# Patient Record
Sex: MALE | Race: WHITE | NOT HISPANIC OR LATINO | Employment: FULL TIME | URBAN - METROPOLITAN AREA
[De-identification: names, ages, dates, MRNs, and addresses within clinical notes are randomized per-mention and may not be internally consistent; named-entity substitution may affect disease eponyms.]

---

## 2023-07-20 ENCOUNTER — OFFICE VISIT (OUTPATIENT)
Dept: PRIMARY CARE | Facility: CLINIC | Age: 43
End: 2023-07-20
Payer: COMMERCIAL

## 2023-07-20 ENCOUNTER — LAB (OUTPATIENT)
Dept: LAB | Facility: LAB | Age: 43
End: 2023-07-20
Payer: COMMERCIAL

## 2023-07-20 VITALS
TEMPERATURE: 97.5 F | SYSTOLIC BLOOD PRESSURE: 122 MMHG | RESPIRATION RATE: 16 BRPM | WEIGHT: 187 LBS | DIASTOLIC BLOOD PRESSURE: 76 MMHG | BODY MASS INDEX: 24.01 KG/M2 | OXYGEN SATURATION: 98 % | HEART RATE: 72 BPM

## 2023-07-20 DIAGNOSIS — R39.9 UTI SYMPTOMS: ICD-10-CM

## 2023-07-20 DIAGNOSIS — R39.9 UTI SYMPTOMS: Primary | ICD-10-CM

## 2023-07-20 LAB
ALANINE AMINOTRANSFERASE (SGPT) (U/L) IN SER/PLAS: 27 U/L (ref 10–52)
ALBUMIN (G/DL) IN SER/PLAS: 4.4 G/DL (ref 3.4–5)
ALKALINE PHOSPHATASE (U/L) IN SER/PLAS: 70 U/L (ref 33–120)
ANION GAP IN SER/PLAS: 9 MMOL/L (ref 10–20)
ASPARTATE AMINOTRANSFERASE (SGOT) (U/L) IN SER/PLAS: 28 U/L (ref 9–39)
BILIRUBIN TOTAL (MG/DL) IN SER/PLAS: 0.8 MG/DL (ref 0–1.2)
CALCIUM (MG/DL) IN SER/PLAS: 9.3 MG/DL (ref 8.6–10.3)
CARBON DIOXIDE, TOTAL (MMOL/L) IN SER/PLAS: 32 MMOL/L (ref 21–32)
CHLORIDE (MMOL/L) IN SER/PLAS: 100 MMOL/L (ref 98–107)
CREATININE (MG/DL) IN SER/PLAS: 0.74 MG/DL (ref 0.5–1.3)
ERYTHROCYTE DISTRIBUTION WIDTH (RATIO) BY AUTOMATED COUNT: 12.3 % (ref 11.5–14.5)
ERYTHROCYTE MEAN CORPUSCULAR HEMOGLOBIN CONCENTRATION (G/DL) BY AUTOMATED: 34.5 G/DL (ref 32–36)
ERYTHROCYTE MEAN CORPUSCULAR VOLUME (FL) BY AUTOMATED COUNT: 94 FL (ref 80–100)
ERYTHROCYTES (10*6/UL) IN BLOOD BY AUTOMATED COUNT: 4.08 X10E12/L (ref 4.5–5.9)
GFR MALE: >90 ML/MIN/1.73M2
GLUCOSE (MG/DL) IN SER/PLAS: 86 MG/DL (ref 74–99)
HEMATOCRIT (%) IN BLOOD BY AUTOMATED COUNT: 38.3 % (ref 41–52)
HEMOGLOBIN (G/DL) IN BLOOD: 13.2 G/DL (ref 13.5–17.5)
LEUKOCYTES (10*3/UL) IN BLOOD BY AUTOMATED COUNT: 14 X10E9/L (ref 4.4–11.3)
PLATELETS (10*3/UL) IN BLOOD AUTOMATED COUNT: 182 X10E9/L (ref 150–450)
POC APPEARANCE, URINE: CLEAR
POC BILIRUBIN, URINE: NEGATIVE
POC BLOOD, URINE: NEGATIVE
POC COLOR, URINE: YELLOW
POC GLUCOSE, URINE: NEGATIVE MG/DL
POC KETONES, URINE: NEGATIVE MG/DL
POC LEUKOCYTES, URINE: ABNORMAL
POC NITRITE,URINE: NEGATIVE
POC PH, URINE: 6 PH
POC PROTEIN, URINE: ABNORMAL MG/DL
POC SPECIFIC GRAVITY, URINE: <=1.005
POC UROBILINOGEN, URINE: 1 EU/DL
POTASSIUM (MMOL/L) IN SER/PLAS: 3.9 MMOL/L (ref 3.5–5.3)
PROTEIN TOTAL: 7.3 G/DL (ref 6.4–8.2)
SODIUM (MMOL/L) IN SER/PLAS: 137 MMOL/L (ref 136–145)
UREA NITROGEN (MG/DL) IN SER/PLAS: 9 MG/DL (ref 6–23)

## 2023-07-20 PROCEDURE — 87186 SC STD MICRODIL/AGAR DIL: CPT

## 2023-07-20 PROCEDURE — 99214 OFFICE O/P EST MOD 30 MIN: CPT | Performed by: NURSE PRACTITIONER

## 2023-07-20 PROCEDURE — 1036F TOBACCO NON-USER: CPT | Performed by: NURSE PRACTITIONER

## 2023-07-20 PROCEDURE — 87077 CULTURE AEROBIC IDENTIFY: CPT

## 2023-07-20 PROCEDURE — 80053 COMPREHEN METABOLIC PANEL: CPT

## 2023-07-20 PROCEDURE — 87086 URINE CULTURE/COLONY COUNT: CPT

## 2023-07-20 PROCEDURE — 85027 COMPLETE CBC AUTOMATED: CPT

## 2023-07-20 PROCEDURE — 36415 COLL VENOUS BLD VENIPUNCTURE: CPT

## 2023-07-20 PROCEDURE — 81002 URINALYSIS NONAUTO W/O SCOPE: CPT | Performed by: NURSE PRACTITIONER

## 2023-07-20 RX ORDER — LISINOPRIL 40 MG/1
20 TABLET ORAL 2 TIMES DAILY
COMMUNITY
End: 2023-12-04 | Stop reason: SDUPTHER

## 2023-07-20 RX ORDER — DIGOXIN 125 MCG
125 TABLET ORAL DAILY
COMMUNITY
End: 2023-12-04 | Stop reason: SDUPTHER

## 2023-07-20 RX ORDER — OMEPRAZOLE 20 MG/1
20 CAPSULE, DELAYED RELEASE ORAL DAILY
COMMUNITY
End: 2023-07-24

## 2023-07-20 RX ORDER — FAMOTIDINE 20 MG/1
20 TABLET, FILM COATED ORAL 2 TIMES DAILY
COMMUNITY
End: 2023-07-24

## 2023-07-20 RX ORDER — METOPROLOL SUCCINATE 100 MG/1
150 TABLET, EXTENDED RELEASE ORAL 2 TIMES DAILY
COMMUNITY
End: 2023-12-04 | Stop reason: SDUPTHER

## 2023-07-20 RX ORDER — DOXYCYCLINE 100 MG/1
100 CAPSULE ORAL 2 TIMES DAILY
Qty: 20 CAPSULE | Refills: 0 | Status: SHIPPED | OUTPATIENT
Start: 2023-07-20 | End: 2023-07-31 | Stop reason: SDUPTHER

## 2023-07-20 ASSESSMENT — ENCOUNTER SYMPTOMS
DIARRHEA: 0
APPETITE CHANGE: 1
FREQUENCY: 1
CHILLS: 1
DYSURIA: 1
HEADACHES: 0
CARDIOVASCULAR NEGATIVE: 1
FLANK PAIN: 0
HEMATURIA: 0
RESPIRATORY NEGATIVE: 1
VOMITING: 0
MYALGIAS: 1
ABDOMINAL PAIN: 0
NAUSEA: 0
FEVER: 0

## 2023-07-20 NOTE — PROGRESS NOTES
Subjective   Patient ID: Tony Pierre is a 42 y.o. male who presents for UTI.    Symptoms started three days ago with dysuria. Patient with the chills last night. No fevers. Patient has frequency and urgency. No blood in the urine. Patient has a stronger smelling urine. No concern for any STIs per pt.          Review of Systems   Constitutional:  Positive for appetite change and chills. Negative for fever.   HENT: Negative.     Respiratory: Negative.     Cardiovascular: Negative.    Gastrointestinal:  Negative for abdominal pain, diarrhea, nausea and vomiting.   Genitourinary:  Positive for dysuria, frequency and urgency. Negative for flank pain, hematuria and testicular pain.   Musculoskeletal:  Positive for myalgias.   Neurological:  Negative for headaches.     Objective   /76   Pulse 72   Temp 36.4 °C (97.5 °F) (Temporal)   Resp 16   Wt 84.8 kg (187 lb)   SpO2 98%   BMI 24.01 kg/m²     Physical Exam  Vitals reviewed.   Constitutional:       General: He is not in acute distress.     Appearance: Normal appearance. He is not ill-appearing or toxic-appearing.   HENT:      Head: Atraumatic.   Eyes:      Conjunctiva/sclera: Conjunctivae normal.   Cardiovascular:      Rate and Rhythm: Normal rate and regular rhythm.      Heart sounds: Normal heart sounds. No murmur heard.  Pulmonary:      Effort: Pulmonary effort is normal.      Breath sounds: Normal breath sounds. No wheezing.   Abdominal:      General: Bowel sounds are normal.      Palpations: Abdomen is soft.      Tenderness: There is no abdominal tenderness. There is no right CVA tenderness, left CVA tenderness or guarding.   Musculoskeletal:         General: Normal range of motion.   Skin:     General: Skin is warm and dry.   Neurological:      General: No focal deficit present.      Mental Status: He is alert.   Psychiatric:         Mood and Affect: Mood normal.         Behavior: Behavior normal.     Assessment/Plan   Problem List Items Addressed  This Visit    None  Visit Diagnoses       UTI symptoms    -  Primary    Relevant Medications    doxycycline (Vibramycin) 100 mg capsule    Other Relevant Orders    Urine Culture    POCT UA (nonautomated) manually resulted (Completed)    CBC    Comprehensive Metabolic Panel        UA indicative of a UTI. will start patient on doxycycline at this time for UTI/prostatitis. will send urine out for culture. Patient had some chills last night, so will get CMP and CBC. patient advised to call the office if symptoms persist in the next 2-3 days despite the use of the medication. patient advised to go to the ER for any fevers, chills, abdominal pain, nausea, vomiting or new/concerning symptoms; he agreed. will call pt when urine culture results become available.

## 2023-07-21 NOTE — RESULT ENCOUNTER NOTE
Tried calling pt several times and it went straight to voicemail. Please let him know that his white blood cell count is high signifying infection. All other labs are stable. How is he feeling?

## 2023-07-23 ENCOUNTER — TELEPHONE (OUTPATIENT)
Dept: PRIMARY CARE | Facility: CLINIC | Age: 43
End: 2023-07-23
Payer: COMMERCIAL

## 2023-07-23 DIAGNOSIS — K20.90 ESOPHAGITIS, UNSPECIFIED WITHOUT BLEEDING: ICD-10-CM

## 2023-07-23 DIAGNOSIS — K21.9 GASTRO-ESOPHAGEAL REFLUX DISEASE WITHOUT ESOPHAGITIS: ICD-10-CM

## 2023-07-23 LAB
URINE CULTURE: ABNORMAL
URINE CULTURE: ABNORMAL

## 2023-07-23 NOTE — TELEPHONE ENCOUNTER
Spoke to patient this morning and relayed results of blood work and urine culture. Blood work was showing that patient has an infection as his WBC was elevated. Patient's urine culture was positive for a UTI. The bacteria is susceptible to doxycycline and he says that he is feeling better. Patient to finish the antibiotics and follow up with his PCP next week. No further questions per pt.

## 2023-07-24 ENCOUNTER — APPOINTMENT (OUTPATIENT)
Dept: PRIMARY CARE | Facility: CLINIC | Age: 43
End: 2023-07-24
Payer: COMMERCIAL

## 2023-07-24 PROBLEM — I50.20 HFREF (HEART FAILURE WITH REDUCED EJECTION FRACTION) (MULTI): Status: ACTIVE | Noted: 2023-07-24

## 2023-07-24 PROBLEM — I42.8 NONISCHEMIC CARDIOMYOPATHY (MULTI): Status: ACTIVE | Noted: 2023-07-24

## 2023-07-24 PROBLEM — K21.9 GASTROESOPHAGEAL REFLUX DISEASE: Status: ACTIVE | Noted: 2023-07-24

## 2023-07-24 PROBLEM — N41.9 PROSTATITIS: Status: ACTIVE | Noted: 2023-07-24

## 2023-07-24 PROBLEM — I50.20 SYSTOLIC HEART FAILURE, ACC/AHA STAGE C (MULTI): Status: ACTIVE | Noted: 2023-07-24

## 2023-07-24 PROBLEM — Z98.890 OTHER SPECIFIED POSTPROCEDURAL STATES: Status: ACTIVE | Noted: 2023-07-24

## 2023-07-24 PROBLEM — R31.9 BLOOD IN URINE: Status: ACTIVE | Noted: 2023-07-24

## 2023-07-24 PROBLEM — I48.92 ATRIAL FLUTTER WITH RAPID VENTRICULAR RESPONSE (MULTI): Status: ACTIVE | Noted: 2023-07-24

## 2023-07-24 PROBLEM — Z95.9 CARDIAC DEVICE IN SITU: Status: ACTIVE | Noted: 2023-07-24

## 2023-07-24 RX ORDER — SPIRONOLACTONE 25 MG/1
TABLET ORAL
COMMUNITY
Start: 2014-09-01 | End: 2023-07-31 | Stop reason: ALTCHOICE

## 2023-07-24 RX ORDER — OMEPRAZOLE 20 MG/1
20 CAPSULE, DELAYED RELEASE ORAL DAILY
Qty: 90 CAPSULE | Refills: 1 | Status: SHIPPED | OUTPATIENT
Start: 2023-07-24 | End: 2024-01-22

## 2023-07-24 RX ORDER — FAMOTIDINE 20 MG/1
20 TABLET, FILM COATED ORAL 2 TIMES DAILY
Qty: 180 TABLET | Refills: 1 | Status: SHIPPED | OUTPATIENT
Start: 2023-07-24 | End: 2024-01-22

## 2023-07-24 RX ORDER — OMEPRAZOLE 20 MG/1
1 TABLET, DELAYED RELEASE ORAL DAILY
COMMUNITY
End: 2023-07-31 | Stop reason: ALTCHOICE

## 2023-07-25 ENCOUNTER — TELEPHONE (OUTPATIENT)
Dept: PRIMARY CARE | Facility: CLINIC | Age: 43
End: 2023-07-25
Payer: COMMERCIAL

## 2023-07-25 NOTE — TELEPHONE ENCOUNTER
----- Message from FAN Antoine-CNP sent at 7/21/2023 10:55 AM EDT -----  Tried calling pt several times and it went straight to voicemail. Please let him know that his white blood cell count is high signifying infection. All other labs are stable. How is he feeling?

## 2023-07-31 ENCOUNTER — OFFICE VISIT (OUTPATIENT)
Dept: PRIMARY CARE | Facility: CLINIC | Age: 43
End: 2023-07-31
Payer: COMMERCIAL

## 2023-07-31 VITALS
SYSTOLIC BLOOD PRESSURE: 98 MMHG | TEMPERATURE: 97 F | WEIGHT: 186 LBS | BODY MASS INDEX: 25.19 KG/M2 | OXYGEN SATURATION: 97 % | HEIGHT: 72 IN | DIASTOLIC BLOOD PRESSURE: 64 MMHG | HEART RATE: 73 BPM | RESPIRATION RATE: 18 BRPM

## 2023-07-31 DIAGNOSIS — R39.9 UTI SYMPTOMS: ICD-10-CM

## 2023-07-31 DIAGNOSIS — K21.9 GASTROESOPHAGEAL REFLUX DISEASE, UNSPECIFIED WHETHER ESOPHAGITIS PRESENT: ICD-10-CM

## 2023-07-31 DIAGNOSIS — Z00.00 HEALTHCARE MAINTENANCE: ICD-10-CM

## 2023-07-31 DIAGNOSIS — N41.9 PROSTATITIS, UNSPECIFIED PROSTATITIS TYPE: Primary | ICD-10-CM

## 2023-07-31 DIAGNOSIS — I42.8 NONISCHEMIC CARDIOMYOPATHY (MULTI): ICD-10-CM

## 2023-07-31 PROCEDURE — 99396 PREV VISIT EST AGE 40-64: CPT | Performed by: FAMILY MEDICINE

## 2023-07-31 PROCEDURE — 1036F TOBACCO NON-USER: CPT | Performed by: FAMILY MEDICINE

## 2023-07-31 RX ORDER — DOXYCYCLINE 100 MG/1
100 CAPSULE ORAL 2 TIMES DAILY
Qty: 60 CAPSULE | Refills: 1 | Status: SHIPPED | OUTPATIENT
Start: 2023-07-31 | End: 2023-08-30

## 2023-07-31 NOTE — ASSESSMENT & PLAN NOTE
Antibiotic treatment x30 days and will get urology opinion..  In light of recurrence question abscess

## 2023-07-31 NOTE — PATIENT INSTRUCTIONS

## 2023-07-31 NOTE — ASSESSMENT & PLAN NOTE
Discussed with patient recent prostatitis we will repeat urine culture and treat x30 days.    Reviewed health maintenance issues and continue meds for now with good blood pressure control and prior history of..Nonischemic cardiomyopathy along with atrial flutter with rapid ventricular response followed by cardiology

## 2023-07-31 NOTE — PROGRESS NOTES
Tony Pierre is a 42 y.o. male here today a periodic health exam.  I reviewed previous preventative health measures including screening tests, immunizations and labs.      HPI   CURRENT COMPLAINTS OR CONCERNS:   Patient here for follow-up appointment according to patient he urinary symptoms with and burning type sensation urgency almost had an accident with strong smelling urine complicating chills and appetite change and muscle pains.  Seen in urgent care with lab and evaluation with results revealing urinalysis urine culture showing 20-80,000 colony counts Enterobacter cloacae and sensitive to tetracycline.  Additional with point-of-care urinalysis showing moderate leukocytes in the urine with CBC with elevated white count 14,000 and hemoglobin 13.2 hematocrit 38.3 platelets 182,000 with CMP showing electrolytes within normal limits GFR greater than 90 glucose 86 and liver tests within normal limits.        PREVIOUS PREVENTATIVE HEALTH  Colonoscopy : NO  Cologuard : N/A  Mammogram : N/A  Pap Test :  N/A  PSA : N/A  Hepatitis C Antibody : YES -- DATE 3/14/18  HIV Screening : YES -- DATE 3/14/18  Prevnar : NO  Tdap : YES -- DATE 7/19/21  Shingrix : NO  Yearly Flu Shot : YES    CURRENT FINDINGS  Healthy Diet : YES  Exercise :  NO  Depression Issues : NO  Alcohol Use : YES --  2  rum  and diet coke  Tobacco Use : NO        Current Outpatient Medications:     digoxin (Lanoxin) 125 MCG tablet, Take 1 tablet (125 mcg) by mouth once daily., Disp: , Rfl:     famotidine (Pepcid) 20 mg tablet, TAKE 1 TABLET BY MOUTH TWICE A DAY, Disp: 180 tablet, Rfl: 1    lisinopril 40 mg tablet, Take 0.5 tablets (20 mg) by mouth 2 times a day., Disp: , Rfl:     metoprolol succinate XL (Toprol-XL) 100 mg 24 hr tablet, Take 1.5 tablets (150 mg) by mouth 2 times a day. Take 1-1/2 tablets (150 mg) twice daily, Disp: , Rfl:     omeprazole (PriLOSEC) 20 mg DR capsule, TAKE 1 CAPSULE BY MOUTH EVERY DAY IN THE MORNING BEFORE BREAKFAST, Disp: 90  capsule, Rfl: 1    doxycycline (Vibramycin) 100 mg capsule, Take 1 capsule (100 mg) by mouth 2 times a day. Take with at least 8 ounces (large glass) of water, do not lie down for 30 minutes after, Disp: 60 capsule, Rfl: 1    Past Medical History:   Diagnosis Date    Personal history of other diseases of the circulatory system 03/05/2018    History of cardiomyopathy    Reflux esophagitis        Past Surgical History:   Procedure Laterality Date    ABLATION OF DYSRHYTHMIC FOCUS      HERNIA REPAIR  04/26/2018    Hernia Repair       Family History   Problem Relation Name Age of Onset    Arthritis Mother         Social History     Tobacco Use    Smoking status: Former     Packs/day: 21.00     Types: Cigarettes    Smokeless tobacco: Former   Vaping Use    Vaping Use: Never used   Substance Use Topics    Alcohol use: Yes     Alcohol/week: 2.0 standard drinks of alcohol     Types: 2 Shots of liquor per week     Comment: occasional    Drug use: Never       Immunization History   Administered Date(s) Administered    Pfizer Purple Cap SARS-CoV-2 03/16/2021, 04/13/2021    Tdap vaccine, age 10 years and older (BOOSTRIX) 07/19/2021   Constitutional; no fever no chills no recent weight gain and no recent weight loss.  eyes:; no loss of vision no discharge from the eyes and no itching of the eyes.  ENT; no neck pain symptoms no ear symptoms and no nasal symptoms.  Cardiovascular; no chest pain no palpitations and no lower extremity edema.  Respiratory; no shortness of breath no cough and no shortness of breath during exertion.  gastrointestinal; no abdominal pain no constipation no heartburn no vomiting no blood in stools and bowel movement frequency normal.  genitourinary; yes  dysuria no hematuria and no pyuria.  Musculoskeletal; no arthralgias and no myalgias.  integumentary;.. No skin lesions  Neurologic. no headaches and no dizziness  hematologic/lymphatic; swollen glands no tendency for easy bleeding and no tendency for easy  bruising  Psychiatric; no anxiety no depression and no emotional problems.       Objective    Visit Vitals  BP 98/64   Pulse 73   Temp 36.1 °C (97 °F)   Resp 18       Physical Exam   Vitals: I have reviewed the vitals  General: Well-developed.  In no acute distress.  Eyes:   sclera nonicteric.  Conjunctiva not injected.  No discharge.   HEAD: Normocephalic, atraumatic.  HEENT   Mucous membranes moist.  Posterior oropharynx nonerythematous, no tonsillar exudates.      No cervical lymphadenopathy.  Cardio: Regular rate and rhythm.  No murmur, rub or gallop.  Pulmonary: Lungs clear to auscultation in all fields.  No accessory muscle use.  GI/: Normal active bowel sounds.  Soft, nontender.  No masses or organomegaly appreciated.  Musculoskeletal: No gross deformities appreciated.  Neuro: Alert, age-appropriate.  Normal muscle tone.  Moving all extremities.  Skin: No rash, bruises or lesions.     Assessment    1. Prostatitis, unspecified prostatitis type  Urine Culture, Sedimentation Rate, CBC and Auto Differential, Prostate Specific Antigen, Screen      2. UTI symptoms  doxycycline (Vibramycin) 100 mg capsule, Referral to Urology      3. Nonischemic cardiomyopathy (CMS/HCC)  Lipid Panel      4. Gastroesophageal reflux disease, unspecified whether esophagitis present  Vitamin B12      5. Healthcare maintenance

## 2023-12-04 ENCOUNTER — OFFICE VISIT (OUTPATIENT)
Dept: CARDIOLOGY | Facility: CLINIC | Age: 43
End: 2023-12-04
Payer: COMMERCIAL

## 2023-12-04 VITALS
HEIGHT: 73 IN | OXYGEN SATURATION: 98 % | BODY MASS INDEX: 24.54 KG/M2 | SYSTOLIC BLOOD PRESSURE: 120 MMHG | DIASTOLIC BLOOD PRESSURE: 74 MMHG | HEART RATE: 76 BPM

## 2023-12-04 DIAGNOSIS — I50.20 SYSTOLIC HEART FAILURE, ACC/AHA STAGE C (MULTI): Primary | ICD-10-CM

## 2023-12-04 DIAGNOSIS — I42.8 NONISCHEMIC CARDIOMYOPATHY (MULTI): ICD-10-CM

## 2023-12-04 PROCEDURE — 1036F TOBACCO NON-USER: CPT | Performed by: INTERNAL MEDICINE

## 2023-12-04 PROCEDURE — 93000 ELECTROCARDIOGRAM COMPLETE: CPT | Performed by: INTERNAL MEDICINE

## 2023-12-04 PROCEDURE — 99213 OFFICE O/P EST LOW 20 MIN: CPT | Performed by: INTERNAL MEDICINE

## 2023-12-04 RX ORDER — METOPROLOL SUCCINATE 100 MG/1
150 TABLET, EXTENDED RELEASE ORAL 2 TIMES DAILY
Qty: 270 TABLET | Refills: 3 | Status: SHIPPED | OUTPATIENT
Start: 2023-12-04

## 2023-12-04 RX ORDER — LISINOPRIL 40 MG/1
20 TABLET ORAL 2 TIMES DAILY
Qty: 90 TABLET | Refills: 3 | Status: SHIPPED | OUTPATIENT
Start: 2023-12-04

## 2023-12-04 RX ORDER — DIGOXIN 125 MCG
125 TABLET ORAL DAILY
Qty: 90 TABLET | Refills: 3 | Status: SHIPPED | OUTPATIENT
Start: 2023-12-04

## 2023-12-04 ASSESSMENT — PAIN SCALES - GENERAL: PAINLEVEL: 0-NO PAIN

## 2023-12-04 NOTE — PATIENT INSTRUCTIONS
It was a pleasure seeing you today. Please contact myself or my team with any questions.     To reach Dr. Rodriguez' office please call [phone] ([place]).   Fax: [phone]   To schedule an appointment call [phone]     If you have any questions or need cardiac medication refills, please call the Heart Failure office at [phone], option [phone]. You may also contact the  Heart Failure Nursing team via email at [email or web address] (Please include your name and date of birth).       1) Continue your current medications  2) Follow up in 1 year at /[facility]

## 2023-12-04 NOTE — PROGRESS NOTES
"[facility] Advanced Heart Failure Clinic  Primary Care Physician: Kyaw Washington DO  Referring Provider/Cardiologist: Nkechi     Date of Visit: 12/04/2023  3:20 PM EST  Location of visit:  [address] [place] [address]     HPI:   Mr. Pierre is a 43M with a PMHx sig for AF/Aflutter s/p multiple ablations and stage C systolic HF/NICM/HFimpEF who returns to the Advanced Heart Failure clinic for ongoing evaluation and management of his cardiomyopathy.     Interval Hx:   Currently denies chest pain, palpitations, shortness of breath, dyspnea on exertion, orthopnea, PND. No edema noted in BLE. Patient denies headaches, dizziness or recent falls.     Patient denies hospitalizations since last visit 1 year ago.       FamHx:  No cardiac history    SocHx:  , living in [facility] with his wife  Nicotine - started at age 17 and was up to 1 ppd prior to quitting (2018)   Alcohol - two drinks per night (rum and coke)  Illicit Drugs - past marijuana use but quit at 20 years ago. No IV drug use.  Former member of the [employer], traveled to multiple places       Current Outpatient Medications   Medication Sig Dispense Refill    digoxin (Lanoxin) 125 MCG tablet Take 1 tablet (125 mcg) by mouth once daily.      famotidine (Pepcid) 20 mg tablet TAKE 1 TABLET BY MOUTH TWICE A  tablet 1    lisinopril 40 mg tablet Take 0.5 tablets (20 mg) by mouth 2 times a day.      metoprolol succinate XL (Toprol-XL) 100 mg 24 hr tablet Take 1.5 tablets (150 mg) by mouth 2 times a day. Take 1-1/2 tablets (150 mg) twice daily      omeprazole (PriLOSEC) 20 mg DR capsule TAKE 1 CAPSULE BY MOUTH EVERY DAY IN THE MORNING BEFORE BREAKFAST 90 capsule 1     No current facility-administered medications for this visit.       No Known Allergies      Visit Vitals  /74 (BP Location: Right arm, Patient Position: Sitting, BP Cuff Size: Adult)   Pulse 76   Ht 1.854 m (6' 1\")   SpO2 98%   BMI 24.54 kg/m²   Smoking Status Former   BSA 2.08 m²    "     Physical Exam:  On exam Mr. iPerre appears his stated age, is alert and oriented x3, and in no acute distress. His sclera are anicteric and his oropharynx has moist mucous membranes. His neck is supple and without thyromegaly. The JVP is ~5 cm of water above the right atrium. His cardiac exam has a regular rhythm, normal S1, S2. No S3/4. There are no murmurs. His lungs are clear to auscultation bilaterally and there is no dullness to percussion. His abdomen is soft, nontender with normoactive bowel sounds. There is no HJR. The extremities are warm and without edema. The skin is dry. There is no rash present. The distal pulses are 2-3+ in all four extremities. His mood and affect are appropriate for todays encounter.      Cardiac Labs/Diagnostics:    Lab Results   Component Value Date    CREATININE 0.74 07/20/2023    BUN 9 07/20/2023     07/20/2023    K 3.9 07/20/2023     07/20/2023    CO2 32 07/20/2023      ECG (12/4/23):  Sinus rhythm (HR 75), normal ECG    Echo (1/20/23):  1. Left ventricular systolic function is low normal with a 50-55% estimated ejection fraction.  2. There is no Doppler evidence of pulmonary hypertension.    ECG (12/13/22):  Sinus rhythm (HR 80), normal ECG    Echo (10/1/18):   1. The left ventricular systolic function is mildly decreased with a 40-45% estimated ejection fraction.   2. Spectral Doppler shows a pseudonormal pattern of left ventricular diastolic filling.   3. There is no evidence of left ventricular hypertrophy.   4. There is global hypokinesis of the left ventricle with minor regional variations.    Echo (2/15/18):  1. The left ventricular systolic function is mildly decreased with a 35-40% estimated ejection fraction.  2. Spectral Doppler shows an impaired relaxation pattern of left ventricular diastolic filling.    Cardiac cath (2/2012):  1. Left main angiographically normal and bifurcates into an LAD and circumflex.  2. LAD angiographically normal with large  first and second diagonal branches which are angiographically normal.  3. Circumflex nondominant and angiographically normal. There is a large first obtuse marginal branch.  4. RCA large dominant vessel which is angiographically normal and has a robust PDA system.  5. Left ventriculography: Dilated left ventricle with reduced systolic function. Ejection fraction roughly 25%. 1+ mitral regurgitation noted.  6. Left heart hemodynamics: Left ventricular systolic pressure low at 85          Impression/Plan:  Mr. Pierre is a 43M with a PMHx sig for AF/Aflutter s/p multiple ablations and stage C systolic HF/NICM/HFimpEF who returns to the Advanced Heart Failure clinic for ongoing evaluation and management of his cardiomyopathy. At the current time he has functional class I symptoms and appears euvolemic on exam.     1) Stage C chronic systolic HF/NICM/HFimpEF (LVEF 40-45-->50-55%; 1/2023); currently without an ICD  Tolerating his current GDMT.   -c/w metoprolol succinate 150 mg bid, lisinopril 20 mg bid, digoxin 125 mcg daily  -will refrain from spironolactone at the current time given h/o borderline elevated K; will also defer SGLT2 given volume status  -no need for ICD at the current time    2) AF/flutter s/p multiple ablations  Remains in sinus rhythm. DOAC stopped by Dr. Arboleda.  -mgmt by Dr. Arboleda      F/U: annually at /[facility]      Krishna,  Thank you for referring Mr. Pierre to the  Advanced Heart Failure Clinic. Please let me know if you have any questions.       ____________________________________________________________  Andrew Rodriguez DO  Section of Advanced Heart Failure and Cardiac Transplantation  Division of Cardiovascular Medicine  [facility]  [facility]

## 2024-01-21 DIAGNOSIS — K21.9 GASTRO-ESOPHAGEAL REFLUX DISEASE WITHOUT ESOPHAGITIS: ICD-10-CM

## 2024-01-21 DIAGNOSIS — K20.90 ESOPHAGITIS, UNSPECIFIED WITHOUT BLEEDING: ICD-10-CM

## 2024-01-22 RX ORDER — OMEPRAZOLE 20 MG/1
20 CAPSULE, DELAYED RELEASE ORAL DAILY
Qty: 90 CAPSULE | Refills: 1 | Status: SHIPPED | OUTPATIENT
Start: 2024-01-22

## 2024-01-22 RX ORDER — FAMOTIDINE 20 MG/1
20 TABLET, FILM COATED ORAL 2 TIMES DAILY
Qty: 180 TABLET | Refills: 1 | Status: SHIPPED | OUTPATIENT
Start: 2024-01-22

## 2024-07-19 DIAGNOSIS — K20.90 ESOPHAGITIS, UNSPECIFIED WITHOUT BLEEDING: ICD-10-CM

## 2024-07-19 RX ORDER — OMEPRAZOLE 20 MG/1
20 CAPSULE, DELAYED RELEASE ORAL DAILY
Qty: 90 CAPSULE | Refills: 1 | Status: SHIPPED | OUTPATIENT
Start: 2024-07-19

## 2024-07-29 ENCOUNTER — APPOINTMENT (OUTPATIENT)
Dept: PRIMARY CARE | Facility: CLINIC | Age: 44
End: 2024-07-29
Payer: COMMERCIAL

## 2024-08-19 ENCOUNTER — APPOINTMENT (OUTPATIENT)
Dept: PRIMARY CARE | Facility: CLINIC | Age: 44
End: 2024-08-19
Payer: COMMERCIAL

## 2024-08-26 ENCOUNTER — APPOINTMENT (OUTPATIENT)
Dept: PRIMARY CARE | Facility: CLINIC | Age: 44
End: 2024-08-26
Payer: COMMERCIAL

## 2024-08-26 VITALS
RESPIRATION RATE: 18 BRPM | BODY MASS INDEX: 25.84 KG/M2 | SYSTOLIC BLOOD PRESSURE: 99 MMHG | DIASTOLIC BLOOD PRESSURE: 64 MMHG | HEART RATE: 64 BPM | OXYGEN SATURATION: 98 % | WEIGHT: 195 LBS | HEIGHT: 73 IN | TEMPERATURE: 97 F

## 2024-08-26 DIAGNOSIS — Z00.00 HEALTHCARE MAINTENANCE: ICD-10-CM

## 2024-08-26 DIAGNOSIS — E66.3 OVERWEIGHT (BMI 25.0-29.9): ICD-10-CM

## 2024-08-26 DIAGNOSIS — R31.9 HEMATURIA, UNSPECIFIED TYPE: ICD-10-CM

## 2024-08-26 DIAGNOSIS — I50.20 SYSTOLIC HEART FAILURE, ACC/AHA STAGE C (MULTI): ICD-10-CM

## 2024-08-26 DIAGNOSIS — K21.9 GASTROESOPHAGEAL REFLUX DISEASE, UNSPECIFIED WHETHER ESOPHAGITIS PRESENT: Primary | ICD-10-CM

## 2024-08-26 PROBLEM — I48.92 ATRIAL FLUTTER WITH RAPID VENTRICULAR RESPONSE (MULTI): Status: RESOLVED | Noted: 2023-07-24 | Resolved: 2024-08-26

## 2024-08-26 LAB
POC APPEARANCE, URINE: CLEAR
POC BILIRUBIN, URINE: NEGATIVE
POC BLOOD, URINE: NEGATIVE
POC COLOR, URINE: YELLOW
POC GLUCOSE, URINE: NEGATIVE MG/DL
POC KETONES, URINE: NEGATIVE MG/DL
POC LEUKOCYTES, URINE: NEGATIVE
POC NITRITE,URINE: NEGATIVE
POC PH, URINE: 6.5 PH
POC PROTEIN, URINE: NEGATIVE MG/DL
POC SPECIFIC GRAVITY, URINE: 1.01
POC UROBILINOGEN, URINE: 0.2 EU/DL

## 2024-08-26 PROCEDURE — 99396 PREV VISIT EST AGE 40-64: CPT | Performed by: FAMILY MEDICINE

## 2024-08-26 PROCEDURE — 3008F BODY MASS INDEX DOCD: CPT | Performed by: FAMILY MEDICINE

## 2024-08-26 PROCEDURE — 81003 URINALYSIS AUTO W/O SCOPE: CPT | Performed by: FAMILY MEDICINE

## 2024-08-26 RX ORDER — LANOLIN ALCOHOL/MO/W.PET/CERES
400 CREAM (GRAM) TOPICAL DAILY
Qty: 90 TABLET | Refills: 3 | Status: SHIPPED | OUTPATIENT
Start: 2024-08-26 | End: 2025-08-26

## 2024-08-26 ASSESSMENT — ENCOUNTER SYMPTOMS
NAUSEA: 0
NUMBNESS: 0
WEAKNESS: 0
ARTHRALGIAS: 0
DIZZINESS: 0
RHINORRHEA: 0
SINUS PAIN: 0
SPEECH DIFFICULTY: 0
MYALGIAS: 0
VOMITING: 0
EYE DISCHARGE: 0
LIGHT-HEADEDNESS: 0
EYE PAIN: 0
TROUBLE SWALLOWING: 0
DIARRHEA: 0
FREQUENCY: 0
BRUISES/BLEEDS EASILY: 0
POLYDIPSIA: 0
CHEST TIGHTNESS: 0
BLOOD IN STOOL: 0
SINUS PRESSURE: 0
ANAL BLEEDING: 0
SEIZURES: 0
POLYPHAGIA: 0
SORE THROAT: 0
HEADACHES: 0
BACK PAIN: 0
WHEEZING: 0
RECTAL PAIN: 0
VOICE CHANGE: 0
NECK STIFFNESS: 0
COUGH: 0
CONSTIPATION: 0
DYSURIA: 0
HEMATURIA: 0
PALPITATIONS: 0
JOINT SWELLING: 0
NECK PAIN: 0
FLANK PAIN: 0
TREMORS: 0
FATIGUE: 0
CHOKING: 0
SHORTNESS OF BREATH: 0
ABDOMINAL PAIN: 0
PHOTOPHOBIA: 0
EYE ITCHING: 0

## 2024-08-26 NOTE — PATIENT INSTRUCTIONS
Please consider exercise program involving walking or some other form of aerobic activity 5 days weekly for 30 minutes... Let's also consider strengthening of large muscle groups like the abdominal muscles or shoulder muscles... Twice weekly with reps of 5/10/15 exercises and gradually increase strength.. This is not heavy strength training but light weight training... Sit ups or back exercise routine.. Please ask for handout if uncertain how to do..This  will help to strengthen your muscles which in turn will help you to lose weight.... You might ask what is the best diet available.. I would strongly encourage you to consider  Weight Watchers.. And as  your  fellow on  Weight Watchers physician attempting to  live this  LIFE  style  choice with you....  I will be glad to give you recommendations on what to eat.. Consider buying Maday bread.., shelia bagle thin bread.. oikos yogurt... eggs  to eat as hard-boiled... Halo top ice cream for snack... All these are delicious foods which.. when eaten and  being compliant eating three  meals daily  breakfast lunch and dinner, drinking  64 ounces of water daily we will all win together !!!!!!!. This will be a means for you to lose weight... Consider also the smart phone maria alejandra ... My plate.. Or My  fitness  pal..,  as additional possibilities for weight loss... Good  luchilton Washington!    Discussed medication side effects.  The  risk benefits and treatment options  discussed with patient.       Please schedule follow-up appointment based upon your improvement/failure to improve/chronic medical conditions and physician recommendations during office appointment at the .       Patient advised to go to er if symptoms worsen or to call answering service, or to return to office for additional evaluation    This note was partially  generated using Dragon voice recognition and there may be incorrect words, wording, spelling, or pronunciation errors that were not  corrected prior to committing the note to the medical record.         Blood  pressure  is   stable Monitor your blood pressure at home and notify if blood pressure consistently over 140 systolic 90 diastolic   See dentist twice yearly  Get checked yearly  30 minutes of moderate intensity exercise 5 days weekly  Application of sunscreen every 2 hours during peak times  Regular sleep schedule  At least 8 hours nightly of sleep..  Limit TV/screen time 30 minutes prior to bedtime

## 2024-08-26 NOTE — PROGRESS NOTES
Tony Pierre is a 44 y.o. male here today a periodic health exam.  I reviewed previous preventative health measures including screening tests, immunizations and labs.      HPI   CURRENT COMPLAINTS OR CONCERNS:      The reflux esophagitis he has been occurring in a recurrent pattern for years. The course has been without change. The reflux is described as mild to moderate. The patient remains compliant on meds.. The patient takes medications in a  daily fashion. Denies dysphagia hoarseness or odynophagia...        PREVIOUS PREVENTATIVE HEALTH  Colonoscopy : N/A  Cologuard : N/A  PSA : N/A  Hepatitis C Antibody : YES -- DATE 3/18  HIV Screening : YES -- DATE 3/18  Prevnar : N/A  Tdap : YES -- DATE 7/19/21  Shingrix : N/A  Yearly Flu Shot : YES    CURRENT FINDINGS  Healthy Diet : YES  Exercise :  YES --  rowing  machine twice weekly  Depression Issues : NO  Alcohol Use : YES --  see above v  Tobacco Use : NO        Current Outpatient Medications:   •  digoxin (Lanoxin) 125 MCG tablet, Take 1 tablet (125 mcg) by mouth once daily., Disp: 90 tablet, Rfl: 3  •  famotidine (Pepcid) 20 mg tablet, TAKE 1 TABLET BY MOUTH TWICE A DAY, Disp: 180 tablet, Rfl: 1  •  lisinopril 40 mg tablet, Take 0.5 tablets (20 mg) by mouth 2 times a day., Disp: 90 tablet, Rfl: 3  •  magnesium oxide (Mag-Ox) 400 mg (241.3 mg magnesium) tablet, Take 1 tablet (400 mg) by mouth once daily., Disp: 90 tablet, Rfl: 3  •  metoprolol succinate XL (Toprol-XL) 100 mg 24 hr tablet, Take 1.5 tablets (150 mg) by mouth 2 times a day. Take 1-1/2 tablets (150 mg) twice daily, Disp: 270 tablet, Rfl: 3  •  omeprazole (PriLOSEC) 20 mg DR capsule, TAKE 1 CAPSULE BY MOUTH EVERY DAY IN THE MORNING BEFORE BREAKFAST, Disp: 90 capsule, Rfl: 1    Past Medical History:   Diagnosis Date   • Personal history of other diseases of the circulatory system 03/05/2018    History of cardiomyopathy   • Reflux esophagitis        Past Surgical History:   Procedure Laterality Date    • ABLATION OF DYSRHYTHMIC FOCUS     • HERNIA REPAIR  04/26/2018    Hernia Repair       Family History   Problem Relation Name Age of Onset   • Arthritis Mother         Social History     Tobacco Use   • Smoking status: Former     Types: Cigarettes   • Smokeless tobacco: Former   Vaping Use   • Vaping status: Never Used   Substance Use Topics   • Alcohol use: Yes     Alcohol/week: 2.0 standard drinks of alcohol     Types: 2 Shots of liquor per week     Comment: occasional   • Drug use: Never       Immunization History   Administered Date(s) Administered   • Pfizer Purple Cap SARS-CoV-2 03/16/2021, 04/13/2021   • Tdap vaccine, age 7 year and older (BOOSTRIX, ADACEL) 07/19/2021       Review of Systems   Constitutional:  Negative for fatigue.   HENT:  Negative for ear pain, hearing loss, mouth sores, nosebleeds, postnasal drip, rhinorrhea, sinus pressure, sinus pain, sore throat, trouble swallowing and voice change.    Eyes:  Negative for photophobia, pain, discharge, itching and visual disturbance.   Respiratory:  Negative for cough, choking, chest tightness, shortness of breath and wheezing.    Cardiovascular:  Negative for chest pain, palpitations and leg swelling.   Gastrointestinal:  Negative for abdominal pain, anal bleeding, blood in stool, constipation, diarrhea, nausea, rectal pain and vomiting.   Endocrine: Negative for cold intolerance, heat intolerance, polydipsia, polyphagia and polyuria.   Genitourinary:  Negative for dysuria, flank pain, frequency, genital sores, hematuria, penile pain and testicular pain.   Musculoskeletal:  Negative for arthralgias, back pain, gait problem, joint swelling, myalgias, neck pain and neck stiffness.   Skin:  Negative for rash.   Allergic/Immunologic: Negative for environmental allergies.   Neurological:  Negative for dizziness, tremors, seizures, syncope, speech difficulty, weakness, light-headedness, numbness and headaches.   Hematological:  Does not bruise/bleed easily.          Objective    Visit Vitals  BP 99/64   Pulse 64   Temp 36.1 °C (97 °F)   Resp 18       Physical Exam  Vitals reviewed.   Constitutional:       Appearance: Normal appearance.   HENT:      Head: Normocephalic.      Right Ear: External ear normal.      Left Ear: External ear normal.      Nose: Nose normal.      Mouth/Throat:      Mouth: Mucous membranes are moist.   Eyes:      Conjunctiva/sclera: Conjunctivae normal.   Cardiovascular:      Rate and Rhythm: Regular rhythm.      Heart sounds: Normal heart sounds.   Pulmonary:      Effort: Pulmonary effort is normal.      Breath sounds: Normal breath sounds.   Abdominal:      General: Bowel sounds are normal.      Palpations: Abdomen is soft.   Musculoskeletal:         General: Normal range of motion.      Cervical back: Neck supple.   Skin:     General: Skin is warm and dry.   Neurological:      General: No focal deficit present.      Mental Status: He is alert and oriented to person, place, and time.   Psychiatric:         Behavior: Behavior normal.         Judgment: Judgment normal.          Assessment    1. Gastroesophageal reflux disease, unspecified whether esophagitis present  CBC and Auto Differential, Vitamin B12, Magnesium, magnesium oxide (Mag-Ox) 400 mg (241.3 mg magnesium) tablet      2. Healthcare maintenance        3. Systolic heart failure, ACC/AHA stage C (Multi)  Comprehensive Metabolic Panel, Lipid Panel      4. Hematuria, unspecified type        5. Overweight (BMI 25.0-29.9)          Overweight (BMI 25.0-29.9)  See wrap up     Healthcare maintenance  Blood  pressure  is   stable Monitor your blood pressure at home and notify if blood pressure consistently over 140 systolic 90 diastolic   See dentist twice yearly  Get checked yearly  30 minutes of moderate intensity exercise 5 days weekly  Application of sunscreen every 2 hours during peak times  Regular sleep schedule  At least 8 hours nightly of sleep..  Limit TV/screen time 30 minutes prior to  bedtime     Gastroesophageal reflux disease   The reflux esophagitis he has been occurring in a recurrent pattern for years. The course has been without change. The reflux is described as mild to moderate. The patient remains compliant on meds.. The patient takes medications in a  daily fashion. Denies dysphagia hoarseness or odynophagia...   stable and continue meds

## 2024-08-26 NOTE — ASSESSMENT & PLAN NOTE
The reflux esophagitis he has been occurring in a recurrent pattern for years. The course has been without change. The reflux is described as mild to moderate. The patient remains compliant on meds.. The patient takes medications in a  daily fashion. Denies dysphagia hoarseness or odynophagia...   stable and continue meds

## 2024-12-09 ENCOUNTER — APPOINTMENT (OUTPATIENT)
Dept: CARDIOLOGY | Facility: CLINIC | Age: 44
End: 2024-12-09
Payer: COMMERCIAL

## 2024-12-09 VITALS
BODY MASS INDEX: 25.84 KG/M2 | HEART RATE: 83 BPM | WEIGHT: 195 LBS | HEIGHT: 73 IN | OXYGEN SATURATION: 99 % | DIASTOLIC BLOOD PRESSURE: 74 MMHG | SYSTOLIC BLOOD PRESSURE: 112 MMHG

## 2024-12-09 DIAGNOSIS — I50.20 HFREF (HEART FAILURE WITH REDUCED EJECTION FRACTION): Primary | ICD-10-CM

## 2024-12-09 DIAGNOSIS — I50.20 SYSTOLIC HEART FAILURE, ACC/AHA STAGE C: ICD-10-CM

## 2024-12-09 PROCEDURE — 93000 ELECTROCARDIOGRAM COMPLETE: CPT | Performed by: INTERNAL MEDICINE

## 2024-12-09 PROCEDURE — 3008F BODY MASS INDEX DOCD: CPT | Performed by: INTERNAL MEDICINE

## 2024-12-09 PROCEDURE — 99213 OFFICE O/P EST LOW 20 MIN: CPT | Performed by: INTERNAL MEDICINE

## 2024-12-09 RX ORDER — LISINOPRIL 40 MG/1
20 TABLET ORAL 2 TIMES DAILY
Qty: 90 TABLET | Refills: 3 | Status: SHIPPED | OUTPATIENT
Start: 2024-12-09

## 2024-12-09 RX ORDER — METOPROLOL SUCCINATE 100 MG/1
150 TABLET, EXTENDED RELEASE ORAL 2 TIMES DAILY
Qty: 270 TABLET | Refills: 3 | Status: SHIPPED | OUTPATIENT
Start: 2024-12-09

## 2024-12-09 RX ORDER — DIGOXIN 125 MCG
125 TABLET ORAL DAILY
Qty: 90 TABLET | Refills: 3 | Status: SHIPPED | OUTPATIENT
Start: 2024-12-09

## 2024-12-09 NOTE — PROGRESS NOTES
[facility]  Primary Care Physician: Kyaw Washington DO  Referring Provider/Cardiologist: Nkechi     Date of Visit: 12/09/2024  3:40 PM EST  Location of visit:  [address] [place] [address]     HPI:   Mr. Pierre is a 44M with a PMHx sig for AF/Aflutter s/p multiple ablations and stage C systolic HF/NICM/HFimpEF who returns to the Advanced Heart Failure clinic for ongoing evaluation and management of his cardiomyopathy.     Interval Hx:   Currently denies chest pain, palpitations, shortness of breath, dyspnea on exertion, orthopnea, PND. No edema noted in BLE. Patient denies headaches, dizziness or recent falls.     Hospitalizations: Denies       FamHx:  No cardiac history    SocHx:  , living in [facility] with his wife  Nicotine - started at age 17 and was up to 1 ppd prior to quitting (2018)   Alcohol - two drinks per night (rum and coke)  Illicit Drugs - past marijuana use but quit at 20 years ago. No IV drug use.  Former member of the [employer], traveled to multiple places       Current Outpatient Medications   Medication Sig Dispense Refill    digoxin (Lanoxin) 125 MCG tablet Take 1 tablet (125 mcg) by mouth once daily. 90 tablet 3    famotidine (Pepcid) 20 mg tablet TAKE 1 TABLET BY MOUTH TWICE A  tablet 1    lisinopril 40 mg tablet Take 0.5 tablets (20 mg) by mouth 2 times a day. 90 tablet 3    magnesium oxide (Mag-Ox) 400 mg (241.3 mg magnesium) tablet Take 1 tablet (400 mg) by mouth once daily. 90 tablet 3    metoprolol succinate XL (Toprol-XL) 100 mg 24 hr tablet Take 1.5 tablets (150 mg) by mouth 2 times a day. Take 1-1/2 tablets (150 mg) twice daily 270 tablet 3    omeprazole (PriLOSEC) 20 mg DR capsule TAKE 1 CAPSULE BY MOUTH EVERY DAY IN THE MORNING BEFORE BREAKFAST 90 capsule 1     No current facility-administered medications for this visit.       No Known Allergies      Visit Vitals  /74 (BP Location: Right arm, Patient Position: Sitting)   Pulse 83   Ht  "1.854 m (6' 1\")   Wt 88.5 kg (195 lb)   SpO2 99%   BMI 25.73 kg/m²   Smoking Status Former   BSA 2.13 m²        Physical Exam:  On exam Mr. Pierre appears his stated age, is alert and oriented x3, and in no acute distress. His sclera are anicteric and his oropharynx has moist mucous membranes. His neck is supple and without thyromegaly. The JVP is ~5 cm of water above the right atrium. His cardiac exam has a regular rhythm, normal S1, S2. No S3/4. There are no murmurs. His lungs are clear to auscultation bilaterally and there is no dullness to percussion. His abdomen is soft, nontender with normoactive bowel sounds. There is no HJR. The extremities are warm and without edema. The skin is dry. There is no rash present. The distal pulses are 2+ in all four extremities. His mood and affect are appropriate for todays encounter.      Cardiac Labs/Diagnostics:    Lab Results   Component Value Date    CREATININE 0.74 07/20/2023    BUN 9 07/20/2023     07/20/2023    K 3.9 07/20/2023     07/20/2023    CO2 32 07/20/2023        ECG (12/9/24):  Sinus rhythm (HR 71), normal ECG    ECG (12/4/23):  Sinus rhythm (HR 75), normal ECG    Echo (1/20/23):  1. Left ventricular systolic function is low normal with a 50-55% estimated ejection fraction.  2. There is no Doppler evidence of pulmonary hypertension.    ECG (12/13/22):  Sinus rhythm (HR 80), normal ECG    Echo (10/1/18):  1. The left ventricular systolic function is mildly decreased with a 40-45% estimated ejection fraction.  2. Spectral Doppler shows a pseudonormal pattern of left ventricular diastolic filling.  3. There is no evidence of left ventricular hypertrophy.  4. There is global hypokinesis of the left ventricle with minor regional variations.    Echo (2/15/18):  1. The left ventricular systolic function is mildly decreased with a 35-40% estimated ejection fraction.  2. Spectral Doppler shows an impaired relaxation pattern of left ventricular diastolic " filling.    Cardiac cath (2/2012):  1. Left main angiographically normal and bifurcates into an LAD and circumflex.  2. LAD angiographically normal with large first and second diagonal branches which are angiographically normal.  3. Circumflex nondominant and angiographically normal. There is a large first obtuse marginal branch.  4. RCA large dominant vessel which is angiographically normal and has a robust PDA system.  5. Left ventriculography: Dilated left ventricle with reduced systolic function. Ejection fraction roughly 25%. 1+ mitral regurgitation noted.  6. Left heart hemodynamics: Left ventricular systolic pressure low at 85        Impression/Plan:  Mr. Pierre is a 44M with a PMHx sig for AF/Aflutter s/p multiple ablations and stage C systolic HF/NICM/HFimpEF who returns to the Advanced Heart Failure clinic for ongoing evaluation and management of his cardiomyopathy. At the current time he has functional class I symptoms and appears euvolemic on exam.     1) Stage C chronic systolic HF/NICM/HFimpEF (LVEF 40-45-->50-55%; 1/2023); currently without an ICD  Tolerating his current GDMT.   -c/w metoprolol succinate 150 mg bid, lisinopril 20 mg bid, digoxin 125 mcg daily  -will refrain from spironolactone at the current time given h/o borderline elevated K; will also defer SGLT2 given volume status  -no need for ICD at the current time  -will discuss a repeat echo after his next appt    2) AF/flutter s/p multiple ablations  Remains in sinus rhythm. DOAC stopped by Dr. Arboleda.  -mgmt by Dr. Arboleda      F/U: annually at /[facility]      Krishna,  Thank you for referring Mr. Pierre to the  Advanced [facility] Failure Clinic. Please let me know if you have any questions.     ____________________________________________________________  Andrew Rodriguez DO  Section of Advanced Heart Failure and Cardiac Transplantation  Division of Cardiovascular Medicine  [facility]  [facility]  [place]

## 2025-01-17 DIAGNOSIS — K20.90 ESOPHAGITIS, UNSPECIFIED WITHOUT BLEEDING: ICD-10-CM

## 2025-01-17 RX ORDER — OMEPRAZOLE 20 MG/1
20 CAPSULE, DELAYED RELEASE ORAL DAILY
Qty: 90 CAPSULE | Refills: 0 | Status: SHIPPED | OUTPATIENT
Start: 2025-01-17

## 2025-04-20 DIAGNOSIS — K20.90 ESOPHAGITIS, UNSPECIFIED WITHOUT BLEEDING: ICD-10-CM

## 2025-04-21 RX ORDER — OMEPRAZOLE 20 MG/1
20 CAPSULE, DELAYED RELEASE ORAL DAILY
Qty: 90 CAPSULE | Refills: 0 | Status: SHIPPED | OUTPATIENT
Start: 2025-04-21

## 2025-04-21 NOTE — TELEPHONE ENCOUNTER
Please schedule an appointment for review   of all med problems ...have sent in 90-day supply.  In the event that you cannot get in before this 90-day supply please call my office.Thanks much Dr. Washington.

## 2025-07-27 DIAGNOSIS — K20.90 ESOPHAGITIS, UNSPECIFIED WITHOUT BLEEDING: ICD-10-CM

## 2025-07-28 RX ORDER — OMEPRAZOLE 20 MG/1
20 CAPSULE, DELAYED RELEASE ORAL DAILY
Qty: 90 CAPSULE | Refills: 0 | Status: SHIPPED | OUTPATIENT
Start: 2025-07-28

## 2025-09-04 DIAGNOSIS — K21.9 GASTROESOPHAGEAL REFLUX DISEASE, UNSPECIFIED WHETHER ESOPHAGITIS PRESENT: ICD-10-CM

## 2025-09-04 RX ORDER — LANOLIN ALCOHOL/MO/W.PET/CERES
1 CREAM (GRAM) TOPICAL DAILY
Qty: 90 TABLET | Refills: 3 | Status: SHIPPED | OUTPATIENT
Start: 2025-09-04

## 2025-12-09 ENCOUNTER — APPOINTMENT (OUTPATIENT)
Dept: CARDIOLOGY | Facility: CLINIC | Age: 45
End: 2025-12-09
Payer: COMMERCIAL